# Patient Record
Sex: MALE | Race: WHITE | URBAN - METROPOLITAN AREA
[De-identification: names, ages, dates, MRNs, and addresses within clinical notes are randomized per-mention and may not be internally consistent; named-entity substitution may affect disease eponyms.]

---

## 2022-12-13 ENCOUNTER — OFFICE (OUTPATIENT)
Dept: URBAN - METROPOLITAN AREA CLINIC 115 | Facility: CLINIC | Age: 22
Setting detail: OPHTHALMOLOGY
End: 2022-12-13
Payer: COMMERCIAL

## 2022-12-13 DIAGNOSIS — H53.19: ICD-10-CM

## 2022-12-13 DIAGNOSIS — H01.002: ICD-10-CM

## 2022-12-13 DIAGNOSIS — H10.45: ICD-10-CM

## 2022-12-13 DIAGNOSIS — H01.001: ICD-10-CM

## 2022-12-13 DIAGNOSIS — H16.223: ICD-10-CM

## 2022-12-13 DIAGNOSIS — L71.0: ICD-10-CM

## 2022-12-13 DIAGNOSIS — H01.005: ICD-10-CM

## 2022-12-13 DIAGNOSIS — H01.004: ICD-10-CM

## 2022-12-13 PROCEDURE — 99213 OFFICE O/P EST LOW 20 MIN: CPT | Performed by: OPHTHALMOLOGY

## 2022-12-13 ASSESSMENT — KERATOMETRY
OD_K1POWER_DIOPTERS: 42.75
OS_K2POWER_DIOPTERS: 44.25
OD_AXISANGLE_DEGREES: 101
OD_K2POWER_DIOPTERS: 45.25
OS_AXISANGLE_DEGREES: 073
OS_K1POWER_DIOPTERS: 43.00

## 2022-12-13 ASSESSMENT — SPHEQUIV_DERIVED
OS_SPHEQUIV: 0.25
OD_SPHEQUIV: 1.125
OS_SPHEQUIV: 0.125
OD_SPHEQUIV: 1.125

## 2022-12-13 ASSESSMENT — REFRACTION_MANIFEST
OS_CYLINDER: -1.50
OD_SPHERE: +1.75
OD_VA1: 20/20
OS_AXIS: 150
OD_AXIS: 10
OD_CYLINDER: -1.25
OS_SPHERE: +1.00
OS_VA1: 20/20

## 2022-12-13 ASSESSMENT — LID EXAM ASSESSMENTS
OD_BLEPHARITIS: RLL RUL 1+
OS_BLEPHARITIS: LLL LUL 1+

## 2022-12-13 ASSESSMENT — VISUAL ACUITY
OD_BCVA: 20/25
OS_BCVA: 20/25

## 2022-12-13 ASSESSMENT — REFRACTION_AUTOREFRACTION
OS_CYLINDER: -1.75
OD_SPHERE: +2.25
OS_SPHERE: +1.00
OD_CYLINDER: -2.25
OS_AXIS: 154
OD_AXIS: 017

## 2022-12-13 ASSESSMENT — REFRACTION_CURRENTRX
OD_OVR_VA: 20/
OD_SPHERE: +0.75
OS_CYLINDER: -0.50
OS_AXIS: 137
OD_CYLINDER: -0.75
OD_AXIS: 028
OS_OVR_VA: 20/
OS_SPHERE: +0.50

## 2022-12-13 ASSESSMENT — CONFRONTATIONAL VISUAL FIELD TEST (CVF)
OS_FINDINGS: FULL
OD_FINDINGS: FULL

## 2022-12-13 ASSESSMENT — AXIALLENGTH_DERIVED
OD_AL: 22.9859
OS_AL: 23.4495
OD_AL: 22.9859
OS_AL: 23.4977

## 2022-12-13 ASSESSMENT — SUPERFICIAL PUNCTATE KERATITIS (SPK)
OD_SPK: T
OS_SPK: T

## 2022-12-13 ASSESSMENT — TONOMETRY
OD_IOP_MMHG: 16
OS_IOP_MMHG: 18

## 2023-07-10 ENCOUNTER — APPOINTMENT (OUTPATIENT)
Dept: PULMONOLOGY | Facility: CLINIC | Age: 23
End: 2023-07-10
Payer: COMMERCIAL

## 2023-07-10 ENCOUNTER — NON-APPOINTMENT (OUTPATIENT)
Age: 23
End: 2023-07-10

## 2023-07-10 VITALS
HEART RATE: 100 BPM | SYSTOLIC BLOOD PRESSURE: 118 MMHG | HEIGHT: 66 IN | WEIGHT: 136 LBS | RESPIRATION RATE: 16 BRPM | DIASTOLIC BLOOD PRESSURE: 74 MMHG | OXYGEN SATURATION: 98 % | BODY MASS INDEX: 21.86 KG/M2

## 2023-07-10 DIAGNOSIS — F41.9 ANXIETY DISORDER, UNSPECIFIED: ICD-10-CM

## 2023-07-10 PROBLEM — Z00.00 ENCOUNTER FOR PREVENTIVE HEALTH EXAMINATION: Status: ACTIVE | Noted: 2023-07-10

## 2023-07-10 PROCEDURE — 99204 OFFICE O/P NEW MOD 45 MIN: CPT

## 2023-07-10 RX ORDER — CETIRIZINE HCL 10 MG
TABLET ORAL
Refills: 0 | Status: ACTIVE | COMMUNITY

## 2023-07-10 NOTE — HISTORY OF PRESENT ILLNESS
[Obstructive Sleep Apnea] : obstructive sleep apnea [TextBox_77] : 4331 [TextBox_79] : 1000 [TextBox_81] : 2 [TextBox_89] : 1-2 [TextBox_165] : Previous diagnosis of sleep apnea 5 years ago.  Was intolerant of CPAP.  Wishes to be reevaluated [ESS] : 5

## 2023-07-20 ENCOUNTER — OUTPATIENT (OUTPATIENT)
Dept: OUTPATIENT SERVICES | Facility: HOSPITAL | Age: 23
LOS: 1 days | End: 2023-07-20
Payer: COMMERCIAL

## 2023-07-20 DIAGNOSIS — G47.33 OBSTRUCTIVE SLEEP APNEA (ADULT) (PEDIATRIC): ICD-10-CM

## 2023-07-20 PROCEDURE — 95800 SLP STDY UNATTENDED: CPT

## 2023-08-07 ENCOUNTER — APPOINTMENT (OUTPATIENT)
Dept: PULMONOLOGY | Facility: CLINIC | Age: 23
End: 2023-08-07

## 2023-08-22 ENCOUNTER — NON-APPOINTMENT (OUTPATIENT)
Age: 23
End: 2023-08-22

## 2023-08-30 ENCOUNTER — OFFICE (OUTPATIENT)
Dept: URBAN - METROPOLITAN AREA CLINIC 104 | Facility: CLINIC | Age: 23
Setting detail: OPHTHALMOLOGY
End: 2023-08-30
Payer: COMMERCIAL

## 2023-08-30 ENCOUNTER — RX ONLY (RX ONLY)
Age: 23
End: 2023-08-30

## 2023-08-30 DIAGNOSIS — H01.002: ICD-10-CM

## 2023-08-30 DIAGNOSIS — H01.005: ICD-10-CM

## 2023-08-30 DIAGNOSIS — Q10.3: ICD-10-CM

## 2023-08-30 DIAGNOSIS — H01.001: ICD-10-CM

## 2023-08-30 DIAGNOSIS — L71.0: ICD-10-CM

## 2023-08-30 DIAGNOSIS — H01.004: ICD-10-CM

## 2023-08-30 PROCEDURE — 92014 COMPRE OPH EXAM EST PT 1/>: CPT | Performed by: SPECIALIST

## 2023-08-30 PROCEDURE — 92015 DETERMINE REFRACTIVE STATE: CPT | Performed by: SPECIALIST

## 2023-08-30 ASSESSMENT — REFRACTION_MANIFEST
OS_AXIS: 165
OD_SPHERE: +2.75
OD_AXIS: 015
OD_AXIS: 015
OS_CYLINDER: -1.25
OS_CYLINDER: -1.25
OS_AXIS: 165
OS_VA1: 20/20
OS_SPHERE: +2.00
OD_SPHERE: +3.00
OD_CYLINDER: -1.50
OD_VA1: 20/20
OD_CYLINDER: -2.00
OD_VA1: 20/20
OS_SPHERE: +2.25
OS_VA1: 20/20

## 2023-08-30 ASSESSMENT — REFRACTION_AUTOREFRACTION
OD_CYLINDER: -1.75
OS_SPHERE: +2.00
OS_AXIS: 152
OD_AXIS: 14
OS_CYLINDER: -1.75
OD_SPHERE: +3.00

## 2023-08-30 ASSESSMENT — SPHEQUIV_DERIVED
OS_SPHEQUIV: 1.375
OD_SPHEQUIV: 1.75
OD_SPHEQUIV: 2.25
OD_SPHEQUIV: 2.125
OS_SPHEQUIV: 1.125
OS_SPHEQUIV: 1.625

## 2023-08-30 ASSESSMENT — CONFRONTATIONAL VISUAL FIELD TEST (CVF)
OD_FINDINGS: FULL
OS_FINDINGS: FULL

## 2023-08-30 ASSESSMENT — LID EXAM ASSESSMENTS
OD_BLEPHARITIS: RLL RUL 1+
OS_BLEPHARITIS: LLL LUL 1+

## 2023-08-30 ASSESSMENT — VISUAL ACUITY
OD_BCVA: 20/20
OS_BCVA: 20/20

## 2023-08-30 ASSESSMENT — SUPERFICIAL PUNCTATE KERATITIS (SPK)
OS_SPK: T
OD_SPK: T

## 2023-11-01 ENCOUNTER — OFFICE (OUTPATIENT)
Dept: URBAN - METROPOLITAN AREA CLINIC 115 | Facility: CLINIC | Age: 23
Setting detail: OPHTHALMOLOGY
End: 2023-11-01
Payer: COMMERCIAL

## 2023-11-01 DIAGNOSIS — H01.002: ICD-10-CM

## 2023-11-01 DIAGNOSIS — H10.45: ICD-10-CM

## 2023-11-01 DIAGNOSIS — H01.001: ICD-10-CM

## 2023-11-01 DIAGNOSIS — H01.005: ICD-10-CM

## 2023-11-01 DIAGNOSIS — L71.0: ICD-10-CM

## 2023-11-01 DIAGNOSIS — H01.004: ICD-10-CM

## 2023-11-01 PROCEDURE — 99213 OFFICE O/P EST LOW 20 MIN: CPT | Performed by: OPHTHALMOLOGY

## 2023-11-01 ASSESSMENT — REFRACTION_MANIFEST
OD_AXIS: 015
OD_AXIS: 015
OD_SPHERE: +2.75
OS_SPHERE: +2.00
OS_CYLINDER: -1.25
OS_AXIS: 165
OD_CYLINDER: -2.00
OD_VA1: 20/20
OS_VA1: 20/20
OS_AXIS: 165
OD_CYLINDER: -1.50
OD_VA1: 20/20
OD_SPHERE: +3.00
OS_SPHERE: +2.25
OS_CYLINDER: -1.25
OS_VA1: 20/20

## 2023-11-01 ASSESSMENT — SPHEQUIV_DERIVED
OS_SPHEQUIV: 1.125
OD_SPHEQUIV: 1.75
OS_SPHEQUIV: 1.375
OD_SPHEQUIV: 2.125
OS_SPHEQUIV: 1.625
OD_SPHEQUIV: 2.25

## 2023-11-01 ASSESSMENT — CONFRONTATIONAL VISUAL FIELD TEST (CVF)
OS_FINDINGS: FULL
OD_FINDINGS: FULL

## 2023-11-01 ASSESSMENT — REFRACTION_AUTOREFRACTION
OS_SPHERE: +2.00
OS_AXIS: 152
OS_CYLINDER: -1.75
OD_AXIS: 14
OD_CYLINDER: -1.75
OD_SPHERE: +3.00

## 2023-11-01 ASSESSMENT — LID EXAM ASSESSMENTS
OS_BLEPHARITIS: LLL LUL 1+
OD_BLEPHARITIS: RLL RUL 1+

## 2023-11-01 ASSESSMENT — SUPERFICIAL PUNCTATE KERATITIS (SPK)
OD_SPK: T
OS_SPK: T

## 2023-11-02 ENCOUNTER — OUTPATIENT (OUTPATIENT)
Dept: OUTPATIENT SERVICES | Facility: HOSPITAL | Age: 23
LOS: 1 days | End: 2023-11-02
Payer: COMMERCIAL

## 2023-11-02 DIAGNOSIS — G47.33 OBSTRUCTIVE SLEEP APNEA (ADULT) (PEDIATRIC): ICD-10-CM

## 2023-11-02 PROCEDURE — 95810 POLYSOM 6/> YRS 4/> PARAM: CPT

## 2023-11-02 PROCEDURE — 95810 POLYSOM 6/> YRS 4/> PARAM: CPT | Mod: 26

## 2023-12-12 ENCOUNTER — OFFICE (OUTPATIENT)
Dept: URBAN - METROPOLITAN AREA CLINIC 115 | Facility: CLINIC | Age: 23
Setting detail: OPHTHALMOLOGY
End: 2023-12-12
Payer: COMMERCIAL

## 2023-12-12 DIAGNOSIS — H16.222: ICD-10-CM

## 2023-12-12 DIAGNOSIS — H01.002: ICD-10-CM

## 2023-12-12 DIAGNOSIS — H01.005: ICD-10-CM

## 2023-12-12 DIAGNOSIS — L71.0: ICD-10-CM

## 2023-12-12 DIAGNOSIS — H01.004: ICD-10-CM

## 2023-12-12 DIAGNOSIS — H16.223: ICD-10-CM

## 2023-12-12 DIAGNOSIS — H10.45: ICD-10-CM

## 2023-12-12 DIAGNOSIS — H16.221: ICD-10-CM

## 2023-12-12 DIAGNOSIS — H01.001: ICD-10-CM

## 2023-12-12 PROCEDURE — 99213 OFFICE O/P EST LOW 20 MIN: CPT | Performed by: OPHTHALMOLOGY

## 2023-12-12 PROCEDURE — 83861 MICROFLUID ANALY TEARS: CPT | Performed by: OPHTHALMOLOGY

## 2023-12-12 PROCEDURE — 83861 MICROFLUID ANALY TEARS: CPT | Mod: QW,LT | Performed by: OPHTHALMOLOGY

## 2023-12-12 ASSESSMENT — REFRACTION_MANIFEST
OS_CYLINDER: -1.25
OS_SPHERE: +2.00
OD_VA1: 20/20
OD_AXIS: 015
OS_AXIS: 165
OS_VA1: 20/20
OS_CYLINDER: -1.25
OD_CYLINDER: -2.00
OD_SPHERE: +2.75
OS_SPHERE: +2.25
OD_CYLINDER: -1.50
OS_VA1: 20/20
OD_AXIS: 015
OD_SPHERE: +3.00
OS_AXIS: 165
OD_VA1: 20/20

## 2023-12-12 ASSESSMENT — SPHEQUIV_DERIVED
OS_SPHEQUIV: 1.625
OS_SPHEQUIV: 1.375
OD_SPHEQUIV: 1.75
OD_SPHEQUIV: 2.25
OD_SPHEQUIV: 2.125
OS_SPHEQUIV: 1.125

## 2023-12-12 ASSESSMENT — CONFRONTATIONAL VISUAL FIELD TEST (CVF)
OD_FINDINGS: FULL
OS_FINDINGS: FULL

## 2023-12-12 ASSESSMENT — REFRACTION_AUTOREFRACTION
OS_CYLINDER: -1.75
OS_AXIS: 152
OS_SPHERE: +2.00
OD_SPHERE: +3.00
OD_CYLINDER: -1.75
OD_AXIS: 14

## 2023-12-12 ASSESSMENT — LID EXAM ASSESSMENTS
OD_BLEPHARITIS: RLL RUL 1+
OS_BLEPHARITIS: LLL LUL 1+

## 2023-12-12 ASSESSMENT — SUPERFICIAL PUNCTATE KERATITIS (SPK)
OD_SPK: T
OS_SPK: T

## 2023-12-14 ENCOUNTER — APPOINTMENT (OUTPATIENT)
Dept: PULMONOLOGY | Facility: CLINIC | Age: 23
End: 2023-12-14
Payer: COMMERCIAL

## 2023-12-14 VITALS
HEIGHT: 67 IN | DIASTOLIC BLOOD PRESSURE: 72 MMHG | OXYGEN SATURATION: 97 % | HEART RATE: 88 BPM | BODY MASS INDEX: 21.66 KG/M2 | SYSTOLIC BLOOD PRESSURE: 104 MMHG | WEIGHT: 138 LBS | RESPIRATION RATE: 16 BRPM

## 2023-12-14 DIAGNOSIS — G47.9 SLEEP DISORDER, UNSPECIFIED: ICD-10-CM

## 2023-12-14 PROCEDURE — 99213 OFFICE O/P EST LOW 20 MIN: CPT

## 2023-12-14 RX ORDER — DOXYCYCLINE HYCLATE 50 MG/1
50 TABLET, DELAYED RELEASE ORAL
Refills: 0 | Status: ACTIVE | COMMUNITY

## 2023-12-14 RX ORDER — PERPHENAZINE 8 MG
TABLET ORAL
Refills: 0 | Status: ACTIVE | COMMUNITY

## 2023-12-14 RX ORDER — METRONIDAZOLE 7.5 MG/G
0.75 CREAM TOPICAL
Qty: 45 | Refills: 0 | Status: DISCONTINUED | COMMUNITY
Start: 2023-07-03 | End: 2023-12-14

## 2023-12-14 RX ORDER — CLINDAMYCIN AND BENZOYL PEROXIDE 50; 10 MG/G; MG/G
1-5 GEL TOPICAL
Qty: 50 | Refills: 0 | Status: DISCONTINUED | COMMUNITY
Start: 2023-03-22 | End: 2023-12-14

## 2023-12-14 RX ORDER — CLINDAMYCIN PHOSPHATE 10 MG/ML
1 SOLUTION TOPICAL
Qty: 60 | Refills: 0 | Status: DISCONTINUED | COMMUNITY
Start: 2023-07-03 | End: 2023-12-14

## 2023-12-14 NOTE — HISTORY OF PRESENT ILLNESS
[Obstructive Sleep Apnea] : obstructive sleep apnea [Lab] : lab [Awakes Unrefreshed] : does not awaken unrefreshed [Awakes with Dry Mouth] : does not awaken with dry mouth [Awakes with Headache] : does not awaken with headache [Snoring] : no snoring [Witnessed Apneas] : no witnessed apneas [TextBox_77] : 9386 [TextBox_79] : 1000 [TextBox_81] : 2 [TextBox_89] : 1-2 [TextBox_100] : 11/2/2023 [TextBox_108] : 3.9 [TextBox_112] : 0.1% [TextBox_116] : 88 [TextBox_120] : REM 4.9, TST 326min [TextBox_165] : Previous diagnosis of sleep apnea 5 years ago.  Was intolerant of CPAP.  [ESS] : 5

## 2023-12-14 NOTE — DISCUSSION/SUMMARY
[FreeTextEntry1] : 23-year-old male seen today for follow-up following his attended sleep study.  Despite a previous diagnosis of sleep apnea 5 years ago patient's current study shows no evidence of obstructive sleep apnea he may have some degree of anxiety induced insomnia and is therefore being referred to a sleep psychologist.  No further workup indicated from this office